# Patient Record
Sex: MALE | Race: WHITE | URBAN - METROPOLITAN AREA
[De-identification: names, ages, dates, MRNs, and addresses within clinical notes are randomized per-mention and may not be internally consistent; named-entity substitution may affect disease eponyms.]

---

## 2017-01-01 ENCOUNTER — OFFICE VISIT (OUTPATIENT)
Dept: FAMILY MEDICINE | Facility: CLINIC | Age: 47
End: 2017-01-01
Payer: MEDICARE

## 2017-01-01 ENCOUNTER — TELEPHONE (OUTPATIENT)
Dept: FAMILY MEDICINE | Facility: CLINIC | Age: 47
End: 2017-01-01

## 2017-01-01 ENCOUNTER — TRANSFERRED RECORDS (OUTPATIENT)
Dept: HEALTH INFORMATION MANAGEMENT | Facility: CLINIC | Age: 47
End: 2017-01-01

## 2017-01-01 ENCOUNTER — RADIANT APPOINTMENT (OUTPATIENT)
Dept: GENERAL RADIOLOGY | Facility: CLINIC | Age: 47
End: 2017-01-01
Attending: PHYSICIAN ASSISTANT
Payer: MEDICARE

## 2017-01-01 VITALS
SYSTOLIC BLOOD PRESSURE: 107 MMHG | OXYGEN SATURATION: 100 % | HEART RATE: 72 BPM | TEMPERATURE: 97.3 F | DIASTOLIC BLOOD PRESSURE: 74 MMHG

## 2017-01-01 VITALS
DIASTOLIC BLOOD PRESSURE: 83 MMHG | SYSTOLIC BLOOD PRESSURE: 123 MMHG | TEMPERATURE: 96.5 F | OXYGEN SATURATION: 97 % | HEART RATE: 62 BPM | RESPIRATION RATE: 16 BRPM

## 2017-01-01 VITALS — DIASTOLIC BLOOD PRESSURE: 75 MMHG | OXYGEN SATURATION: 97 % | SYSTOLIC BLOOD PRESSURE: 102 MMHG | HEART RATE: 60 BPM

## 2017-01-01 DIAGNOSIS — R19.7 DIARRHEA, UNSPECIFIED TYPE: Primary | ICD-10-CM

## 2017-01-01 DIAGNOSIS — R19.7 DIARRHEA, UNSPECIFIED TYPE: ICD-10-CM

## 2017-01-01 DIAGNOSIS — R14.0 BLOATED ABDOMEN: ICD-10-CM

## 2017-01-01 DIAGNOSIS — G80.8 CEREBRAL PALSY, INFANTILE HEMIPLEGIA (H): ICD-10-CM

## 2017-01-01 DIAGNOSIS — R14.3 FLATULENCE, ERUCTATION, AND GAS PAIN: ICD-10-CM

## 2017-01-01 DIAGNOSIS — J30.2 SEASONAL ALLERGIC RHINITIS, UNSPECIFIED CHRONICITY, UNSPECIFIED TRIGGER: ICD-10-CM

## 2017-01-01 DIAGNOSIS — Z01.818 PREOP GENERAL PHYSICAL EXAM: Primary | ICD-10-CM

## 2017-01-01 DIAGNOSIS — R14.2 FLATULENCE, ERUCTATION, AND GAS PAIN: ICD-10-CM

## 2017-01-01 DIAGNOSIS — G82.50 SPASTIC QUADRIPARESIS (H): ICD-10-CM

## 2017-01-01 DIAGNOSIS — K59.00 CONSTIPATION, UNSPECIFIED CONSTIPATION TYPE: Primary | ICD-10-CM

## 2017-01-01 DIAGNOSIS — Z23 ENCOUNTER FOR IMMUNIZATION: ICD-10-CM

## 2017-01-01 DIAGNOSIS — R14.1 FLATULENCE, ERUCTATION, AND GAS PAIN: ICD-10-CM

## 2017-01-01 DIAGNOSIS — K56.609 SMALL BOWEL OBSTRUCTION (H): Primary | ICD-10-CM

## 2017-01-01 LAB
C COLI+JEJUNI+LARI FUSA STL QL NAA+PROBE: NOT DETECTED
C DIFF TOX B STL QL: NEGATIVE
EC STX1 GENE STL QL NAA+PROBE: NOT DETECTED
EC STX2 GENE STL QL NAA+PROBE: NOT DETECTED
ENTERIC PATHOGEN COMMENT: NORMAL
G LAMBLIA AG STL QL IA: NORMAL
NOROV GI+II ORF1-ORF2 JNC STL QL NAA+PR: NOT DETECTED
RVA NSP5 STL QL NAA+PROBE: NOT DETECTED
SALMONELLA SP RPOD STL QL NAA+PROBE: NOT DETECTED
SHIGELLA SP+EIEC IPAH STL QL NAA+PROBE: NOT DETECTED
SPECIMEN SOURCE: NORMAL
SPECIMEN SOURCE: NORMAL
V CHOL+PARA RFBL+TRKH+TNAA STL QL NAA+PR: NOT DETECTED
Y ENTERO RECN STL QL NAA+PROBE: NOT DETECTED

## 2017-01-01 PROCEDURE — 90686 IIV4 VACC NO PRSV 0.5 ML IM: CPT | Performed by: FAMILY MEDICINE

## 2017-01-01 PROCEDURE — G0008 ADMIN INFLUENZA VIRUS VAC: HCPCS | Performed by: FAMILY MEDICINE

## 2017-01-01 PROCEDURE — 87493 C DIFF AMPLIFIED PROBE: CPT | Performed by: FAMILY MEDICINE

## 2017-01-01 PROCEDURE — 87329 GIARDIA AG IA: CPT | Performed by: FAMILY MEDICINE

## 2017-01-01 PROCEDURE — 99213 OFFICE O/P EST LOW 20 MIN: CPT | Performed by: FAMILY MEDICINE

## 2017-01-01 PROCEDURE — 74000 XR ABDOMEN 1 VW: CPT

## 2017-01-01 PROCEDURE — 99213 OFFICE O/P EST LOW 20 MIN: CPT | Mod: 25 | Performed by: FAMILY MEDICINE

## 2017-01-01 PROCEDURE — 99214 OFFICE O/P EST MOD 30 MIN: CPT | Performed by: PHYSICIAN ASSISTANT

## 2017-01-01 PROCEDURE — 87506 IADNA-DNA/RNA PROBE TQ 6-11: CPT | Performed by: FAMILY MEDICINE

## 2017-01-01 RX ORDER — BACLOFEN 20 MG/1
30 TABLET ORAL 3 TIMES DAILY
Qty: 405 TABLET | Refills: 3 | Status: SHIPPED | OUTPATIENT
Start: 2017-01-01

## 2017-01-01 RX ORDER — SIMETHICONE 40MG/0.6ML
40 SUSPENSION, DROPS(FINAL DOSAGE FORM)(ML) ORAL 4 TIMES DAILY PRN
Qty: 45 ML | Refills: 11 | Status: SHIPPED | OUTPATIENT
Start: 2017-01-01

## 2017-01-01 RX ORDER — POLYETHYLENE GLYCOL 3350 17 G/17G
1 POWDER, FOR SOLUTION ORAL DAILY
Qty: 510 G | Refills: 11 | Status: SHIPPED | OUTPATIENT
Start: 2017-01-01

## 2017-01-19 ENCOUNTER — TRANSFERRED RECORDS (OUTPATIENT)
Dept: HEALTH INFORMATION MANAGEMENT | Facility: CLINIC | Age: 47
End: 2017-01-19

## 2017-01-31 DIAGNOSIS — G80.8 CEREBRAL PALSY, INFANTILE HEMIPLEGIA (H): Primary | ICD-10-CM

## 2017-02-01 NOTE — TELEPHONE ENCOUNTER
Medication list as reported by patient now would like new prescription for CALCIUM CARBONATE PO .    Last office visit 12/1/16

## 2017-03-06 NOTE — NURSING NOTE
"Chief Complaint   Patient presents with     Bloated       Initial /74  Pulse 72  Temp 97.3  F (36.3  C) (Oral)  SpO2 100% Estimated body mass index is 19.31 kg/(m^2) as calculated from the following:    Height as of 4/29/11: 5' 8\" (1.727 m).    Weight as of 5/25/12: 127 lb (57.6 kg).  Medication Reconciliation: complete       Delaney Putnam CMA      "

## 2017-03-06 NOTE — PROGRESS NOTES
SUBJECTIVE:                                                    Raghav Silver is a 47 year old male who presents to clinic today for the following health issues:      Constipation     Onset: 4 days ago     Description:   Frequency of bowel movements: 1 per day .  Stool consistency: small caliber    Progression of Symptoms:  worsening    Accompanying Signs & Symptoms:  Abdominal pain (cramping?): no   Blood in stool: no   Rectal pain: no   Nausea/vomiting: no   Weight loss or gain: no    History:   History of abdominal surgery: no   Has tube     Precipitating factors:   Recent use of narcotics, anticholinergics, calcium channel blockers, antacids, or iron supplements: no   Chronic Laxative Use: no          Therapies Tried and outcome: enema , magnesium     Patient could not have BM for 4 days. Staff gave patient enema and he had a large stool. However, this did not resolve the bloated stomach. A couple hours later patient's stomach had gotten worse. Its hard, large, bloated. Patient is not verbal and can't describe how he feels.       Problem list and histories reviewed & adjusted, as indicated.  Additional history: as documented    Patient Active Problem List   Diagnosis     Seasonal allergic rhinitis     House dust mite allergy     Rhinitis, allergic to other allergen     Seasonal allergic conjunctivitis     CARDIOVASCULAR SCREENING; LDL GOAL LESS THAN 160     Diagnostic skin and sensitization tests     GERD (gastroesophageal reflux disease)     Cerebral palsy, infantile hemiplegia (H)     History reviewed. No pertinent past surgical history.    Social History   Substance Use Topics     Smoking status: Never Smoker     Smokeless tobacco: Never Used     Alcohol use No     Family History   Problem Relation Age of Onset     CANCER Father          Current Outpatient Prescriptions   Medication Sig Dispense Refill     calcium carbonate 1250 (500 CA) MG/5ML SUSP suspension TAKE 5ML (500MG ELEMENTAL CALCIUM) PER G-TUBE  TWICE DAILY. DILUTE WITH WATER. (SUPPLEMENT) *SHAKE WELL* 300 mL 11     order for DME Equipment being ordered: Wheelchair that is able to recline for position changes. Also needs seat cushion to prevent pressure sores. If able to, would recommend wheelchair pole to help with tube feedings. 1 each 3     NASAL DECONGESTANT 30 MG/5ML syrup TAKE 5ML (30MG) PER G-TUBE EVERY 4 HOURS AS NEEDED FOR CONGESTION. *3 TOTAL FILLS* 560 mL 11     omeprazole (PRILOSEC) 2 mg/mL Take 10 mLs (20 mg) by mouth daily 10 ML PER G-TUBE DAILY 300 mL 3     sucralfate (CARAFATE) 1 GM/10ML suspension 10 ml via g-tube QID before meals and QHS. Take on empty stomach. 1200 mL 3     pseudoePHEDrine (SUDAFED) 30 MG tablet Take 1 tablet (30 mg) by mouth every 4 hours as needed for congestion 112 tablet 2     olopatadine (PATANOL) 0.1 % ophthalmic solution Place 1 drop into both eyes 2 times daily as needed for allergies 5 mL 11     fluticasone (FLONASE) 50 MCG/ACT nasal spray Spray 2 sprays into both nostrils daily 1 Package 11     cetirizine (ZYRTEC) 5 MG/5ML syrup Take 10 mLs (10 mg) by mouth daily 300 mL 11     ORDER FOR DME Equipment being ordered: pressure alternating mattress 1 each 0     ORDER FOR DME Equipment being ordered: incontinence briefs. Patient has been dealing with incontinence since 6/11/2013 3 Month 4     calcitonin, salmon, (FORTICAL) 200 UNIT/ACT nasal spray Spray 1 spray into one nostril alternating nostrils daily. Alternate nostril each day.       nystatin 197326 UNIT/GM POWD Externally apply  topically as needed.       Sodium Phosphates (ENEMA RE) Place  rectally as needed.       bisacodyl (DULCOLAX) 10 MG suppository Place 10 mg rectally daily as needed.       betamethasone dipropionate (DIPROSONE) 0.05 % cream Apply 0.5 inches topically as needed.       Guaifenesin-Codeine (ROBITUSSIN A-C OR) Take by mouth as needed Reported on 3/6/2017       LANTISEPTIC SKIN PROTECTANT 50 % EX OINT PRN       CALCIUM CARBONATE PO Take  1250mg twice daily.       Docusate Sodium 50 MG/15ML LIQD Take 100 mg by mouth daily.       Magnesium Hydroxide (MILK OF MAGNESIA PO) Take 20 mLs by mouth daily.       CEROVITE OR LIQD 15 ML DAILY       METOCLOPRAMIDE HCL syrup 5mg QID half an hour AC       RANITIDINE  MG OR CAPS daily in am crush dissolve in 30 ml of water       ACETAMINOPHEN PO Take by mouth as needed Reported on 3/6/2017       diphenhydrAMINE (BENADRYL ALLERGY) 12.5 MG/5ML liquid Take by mouth as needed Reported on 3/6/2017       albuterol (2.5 MG/3ML) 0.083% nebulizer solution Take 3 mLs by nebulization every 6 hours as needed for shortness of breath / dyspnea. (Patient not taking: Reported on 3/6/2017) 1 Box 1     Allergies   Allergen Reactions     Dilantin [Barbiturates]        Reviewed and updated as needed this visit by clinical staff  Tobacco  Allergies  Meds  Med Hx  Surg Hx  Fam Hx  Soc Hx      Reviewed and updated as needed this visit by Provider         ROS:  Constitutional, HEENT, cardiovascular, pulmonary, gi and gu systems are negative, except as otherwise noted.    OBJECTIVE:                                                    /74  Pulse 72  Temp 97.3  F (36.3  C) (Oral)  SpO2 100%  There is no height or weight on file to calculate BMI.  GENERAL: in wheelchair, does not appear to be in acute distress  RESP: lungs clear to auscultation - no rales, rhonchi or wheezes  CV: regular rate and rhythm, normal S1 S2, no S3 or S4, no murmur, click or rub, no peripheral edema and peripheral pulses strong  ABDOMEN: largely distended abdomen, decreased bowel sounds      Diagnostic Test Results:  Xray - distended large and small bowel loops, no gas in the rectum     ASSESSMENT/PLAN:                                                          ICD-10-CM    1. Small bowel obstruction (H) K56.69    2. Bloated abdomen R14.0 CANCELED: XR Abdomen 2 Views   3. Cerebral palsy, infantile hemiplegia (H) G80.8      Highly suspected small bowel  obstruction based on xray. Patient was sent to ED to have CT done and treat.     Discussed xray and plan with Dr. Alvaro MENCHACA FM, he has agreed with xray findings and plan.   Patient's aid reported that [place] is a preferred hospital for the nursing home and that is where patient will be taken.    Evita Deluca PA-C  [facility]

## 2017-03-06 NOTE — MR AVS SNAPSHOT
"              After Visit Summary   3/6/2017    Raghav Silver    MRN: [id number]           Patient Information     Date Of Birth          1970        Visit Information        Provider Department      3/6/2017 4:00 PM Evita Deluca PA-C [facility]        Today's Diagnoses     Small bowel obstruction (H)    -  1    Bloated abdomen        Cerebral palsy, infantile hemiplegia (H)           Follow-ups after your visit        Who to contact     If you have questions or need follow up information about today's clinic visit or your schedule please contact [facility] directly at [phone].  Normal or non-critical lab and imaging results will be communicated to you by [employer], letter or phone within 4 business days after the clinic has received the results. If you do not hear from us within 7 days, please contact the clinic through [employer] or phone. If you have a critical or abnormal lab result, we will notify you by phone as soon as possible.  Submit refill requests through [employer] or call your pharmacy and they will forward the refill request to us. Please allow 3 business days for your refill to be completed.          Additional Information About Your Visit        MyChart Information     [employer] lets you send messages to your doctor, view your test results, renew your prescriptions, schedule appointments and more. To sign up, go to [email or web address] . Click on \"Log in\" on the left side of the screen, which will take you to the Welcome page. Then click on \"Sign up Now\" on the right side of the page.     You will be asked to enter the access code listed below, as well as some personal information. Please follow the directions to create your username and password.     Your access code is: [email or web address]  Expires: 3/14/2017 11:02 AM     Your access code will  in 90 days. If you need help or a new code, please call your [facility] or [phone].   "      Care EveryWhere ID     This is your Care EveryWhere ID. This could be used by other organizations to access your [facility] medical records  [id number]        Your Vitals Were     Pulse Temperature Pulse Oximetry             72 97.3  F (36.3  C) (Oral) 100%          Blood Pressure from Last 3 Encounters:   03/06/17 107/74   12/14/16 114/75   07/08/16 114/75    Weight from Last 3 Encounters:   05/25/12 127 lb (57.6 kg)   06/25/11 122 lb 14.4 oz (55.7 kg)   07/01/10 119 lb (54 kg)              Today, you had the following     No orders found for display       Primary Care Provider Office Phone # Fax #    Clemente Michelle [phone]       [facility] [address]  [facility] [address]        Thank you!     Thank you for choosing [facility]  for your care. Our goal is always to provide you with excellent care. Hearing back from our patients is one way we can continue to improve our services. Please take a few minutes to complete the written survey that you may receive in the mail after your visit with us. Thank you!             Your Updated Medication List - Protect others around you: Learn how to safely use, store and throw away your medicines at www.disposemymeds.org.          This list is accurate as of: 3/6/17  5:33 PM.  Always use your most recent med list.                   Brand Name Dispense Instructions for use    ACETAMINOPHEN PO      Take by mouth as needed Reported on 3/6/2017       albuterol (2.5 MG/3ML) 0.083% neb solution     1 Box    Take 3 mLs by nebulization every 6 hours as needed for shortness of breath / dyspnea.       BENADRYL ALLERGY 12.5 MG/5ML liquid   Generic drug:  diphenhydrAMINE      Take by mouth as needed Reported on 3/6/2017       betamethasone dipropionate 0.05 % cream    DIPROSONE     Apply 0.5 inches topically as needed.       * CALCIUM CARBONATE PO      Take 1250mg twice daily.       * calcium carbonate 1250 (500 CA) MG/5ML Susp  suspension     300 mL    TAKE 5ML (500MG ELEMENTAL CALCIUM) PER G-TUBE TWICE DAILY. DILUTE WITH WATER. (SUPPLEMENT) *SHAKE WELL*       cetirizine 5 MG/5ML syrup    zyrTEC    300 mL    Take 10 mLs (10 mg) by mouth daily       Docusate Sodium 50 MG/15ML Liqd      Take 100 mg by mouth daily.       DULCOLAX 10 MG Suppository   Generic drug:  bisacodyl      Place 10 mg rectally daily as needed.       ENEMA RE      Place  rectally as needed.       fluticasone 50 MCG/ACT spray    FLONASE    1 Package    Spray 2 sprays into both nostrils daily       FORTICAL 200 UNIT/ACT nasal spray   Generic drug:  calcitonin (salmon)      Spray 1 spray into one nostril alternating nostrils daily. Alternate nostril each day.       LANTISEPTIC SKIN PROTECTANT 50 % Oint      PRN       METOCLOPRAMIDE HCL      syrup 5mg QID half an hour AC       MILK OF MAGNESIA PO      Take 20 mLs by mouth daily.       multivitamin  s with minerals Liqd      15 ML DAILY       nystatin [id number] UNIT/GM Powd    MYCOSTATIN     Externally apply  topically as needed.       olopatadine 0.1 % ophthalmic solution    PATANOL    5 mL    Place 1 drop into both eyes 2 times daily as needed for allergies       omeprazole 2 mg/mL Susp    priLOSEC    300 mL    Take 10 mLs (20 mg) by mouth daily 10 ML PER G-TUBE DAILY       * order for DME     3 Month    Equipment being ordered: incontinence briefs. Patient has been dealing with incontinence since 6/11/2013       * order for DME     1 each    Equipment being ordered: pressure alternating mattress       order for DME     1 each    Equipment being ordered: Wheelchair that is able to recline for position changes. Also needs seat cushion to prevent pressure sores. If able to, would recommend wheelchair pole to help with tube feedings.       * pseudoePHEDrine 30 MG tablet    SUDAFED    112 tablet    Take 1 tablet (30 mg) by mouth every 4 hours as needed for congestion       * NASAL DECONGESTANT 30 MG/5ML syrup   Generic drug:   pseudoePHEDrine     560 mL    TAKE 5ML (30MG) PER G-TUBE EVERY 4 HOURS AS NEEDED FOR CONGESTION. *3 TOTAL FILLS*       ranitidine HCl 300 MG Caps      daily in am crush dissolve in 30 ml of water       ROBITUSSIN A-C OR      Take by mouth as needed Reported on 3/6/2017       sucralfate 1 GM/10ML suspension    CARAFATE    1200 mL    10 ml via g-tube QID before meals and QHS. Take on empty stomach.       * Notice:  This list has 6 medication(s) that are the same as other medications prescribed for you. Read the directions carefully, and ask your doctor or other care provider to review them with you.

## 2017-03-10 NOTE — TELEPHONE ENCOUNTER
Patient was diagnosed with severe constipation.  If applicable please send prescription to [facility].    Routing to provider. Please advise.    Aleena Suarez RN

## 2017-03-10 NOTE — TELEPHONE ENCOUNTER
Reason for Call:  Other prescription    Detailed comments:  Pt was released from the hospital and wanted him to start on miralax 17 mg daily and did not provide Pt with prescription to send to the pharmacy     Phone Number Patient can be reached at: Other phone number:  [phone]    Best Time: Anytime    Can we leave a detailed message on this number? YES    Call taken on 3/10/2017 at 3:54 PM by Femi Subramanian

## 2017-03-12 PROBLEM — K59.00 CONSTIPATION, UNSPECIFIED CONSTIPATION TYPE: Status: ACTIVE | Noted: 2017-01-01

## 2017-08-22 NOTE — TELEPHONE ENCOUNTER
Reason for Call:  Other Medication form recalled     Detailed comments: [facility] is calling in to get an okay to switch to the tablet form of Medication due to the liquid form is recalled. Please call the Pharmacy to give verbal okay to switch. Thanks.     Phone Number Pharmacy can be reached at: [phone]    Best Time: Anytime     Can we leave a detailed message on this number? YES    Call taken on 8/22/2017 at 5:05 PM by Grady Irvin

## 2017-08-23 NOTE — TELEPHONE ENCOUNTER
Call pharmacy and informed of Dr. Michelle's message.  Pharmacy stated understood and will change the med.    Michael Verma MA

## 2017-09-20 NOTE — NURSING NOTE
Injectable Influenza Immunization Documentation      1.  Has the patient received the information for the injectable influenza vaccine? YES    2. Is the patient 6 months of age or older? YES    3. Does the patient have any of the following contraindications?          Severe allergy to eggs? No     Severe allergic reaction to previous influenza vaccines? No     Allergy to contact lens solution/thimerosol? No     History of Guillain-[place] syndrome? No     Currently have moderate or severe illness? No         4.  The vaccine has been administered and the patient was instructed to wait 15 minutes before leaving the building in the event of an allergic reaction: YES    Vaccination given by Claudia Carnes MA

## 2017-09-20 NOTE — NURSING NOTE
"Chief Complaint   Patient presents with     Diarrhea     loose stools     Bloated       Initial /75 (BP Location: Right arm, Patient Position: Chair, Cuff Size: Adult Large)  Pulse 60  SpO2 97% Estimated body mass index is 19.31 kg/(m^2) as calculated from the following:    Height as of 4/29/11: 5' 8\" (1.727 m).    Weight as of 5/25/12: 127 lb (57.6 kg).  Medication Reconciliation: complete     Claudia Carnes MA      "

## 2017-09-20 NOTE — MR AVS SNAPSHOT
After Visit Summary   9/20/2017    Raghav Silver    MRN: [id number]           Patient Information     Date Of Birth          1970        Visit Information        Provider Department      9/20/2017 9:00 AM Clemente Michelle MD [facility]        Today's Diagnoses     Spastic quadriparesis (H)    -  1    Diarrhea, unspecified type        Encounter for immunization          Care Instructions    How to contact your care team providers:    [facility]  [phone]  Pharmacy [phone]    MYKE [address] Dr., PA-C, Dr., PA-C    Team RN: Bennie XAVIER and Mary DE LA ROSA    Clinic hours  M-Th 7am-7pm   Fri 7am-5pm.   Urgent care M-F 11am-9pm,   Sat/sun 9am-5pm.  Pharmacy M-F 8:00am-8pm Sat/sun 9am-5pm.     All password changes, disabled accounts, or ID changes in [employer]/MyHealth will be done by our Access Services Department.   If you need help with your account or password, call: [phone]. Clinic staff no longer has the ability to change passwords.                         Follow-ups after your visit        Future tests that were ordered for you today     Open Future Orders        Priority Expected Expires Ordered    Enteric Bacteria and Virus Panel by KALEN Stool Routine  9/20/2018 9/20/2017    Clostridium difficile toxin B PCR Routine  10/20/2017 9/20/2017    Giardia antigen Routine  10/20/2017 9/20/2017            Who to contact     If you have questions or need follow up information about today's clinic visit or your schedule please contact [facility] directly at [phone].  Normal or non-critical lab and imaging results will be communicated to you by MyChart, letter or phone within 4 business days after the clinic has received the results. If you do not hear from us within 7 days, please contact the  "clinic through [employer] or phone. If you have a critical or abnormal lab result, we will notify you by phone as soon as possible.  Submit refill requests through [employer] or call your pharmacy and they will forward the refill request to us. Please allow 3 business days for your refill to be completed.          Additional Information About Your Visit        [employer] Information     [employer] lets you send messages to your doctor, view your test results, renew your prescriptions, schedule appointments and more. To sign up, go to [email or web address] . Click on \"Log in\" on the left side of the screen, which will take you to the Welcome page. Then click on \"Sign up Now\" on the right side of the page.     You will be asked to enter the access code listed below, as well as some personal information. Please follow the directions to create your username and password.     Your access code is: [email or web address]  Expires: 2017  9:31 AM     Your access code will  in 90 days. If you need help or a new code, please call your [place] clinic or [phone].        Care EveryWhere ID     This is your Care EveryWhere ID. This could be used by other organizations to access your [place] medical records  [id number]        Your Vitals Were     Pulse Pulse Oximetry                60 97%           Blood Pressure from Last 3 Encounters:   17 102/75   17 107/74   16 114/75    Weight from Last 3 Encounters:   12 127 lb (57.6 kg)   11 122 lb 14.4 oz (55.7 kg)   07/01/10 119 lb (54 kg)              We Performed the Following     C FLU VAC QUADRIVALENT SPLIT VIRUS 3+YRS IM          Today's Medication Changes          These changes are accurate as of: 17  9:31 AM.  If you have any questions, ask your nurse or doctor.               Start taking these medicines.        Dose/Directions    baclofen 20 MG tablet   Commonly known as:  LIORESAL   Used for:  Spastic quadriparesis (H)   Started by:  Clemente Michelle, " MD        Dose:  30 mg   Take 1.5 tablets (30 mg) by mouth 3 times daily   Quantity:  405 tablet   Refills:  3            Where to get your medicines      These medications were sent to [facility] - [place], [place] - [address]  [address] [facility] [address]     Phone:  [phone]     baclofen 20 MG tablet                Primary Care Provider Office Phone # Fax #    Clemente Michelle [phone] [address]  [facility] [address]        Equal Access to Services     [facility]: Hadii aad ku hadasho Soomaali, waaxda luqadaha, qaybta kaalmada adeegyada, waxay laurain hayaan adezoë max . So [facility] [phone].    ATENCIÓN: Si habla español, tiene a lugo disposición servicios gratuitos de asistencia lingüística. [facility] [phone].    We comply with applicable federal civil rights laws and [place] laws. We do not discriminate on the basis of race, color, national origin, age, disability sex, sexual orientation or gender identity.            Thank you!     Thank you for choosing [facility]  for your care. Our goal is always to provide you with excellent care. Hearing back from our patients is one way we can continue to improve our services. Please take a few minutes to complete the written survey that you may receive in the mail after your visit with us. Thank you!             Your Updated Medication List - Protect others around you: Learn how to safely use, store and throw away your medicines at www.disposemymeds.org.          This list is accurate as of: 9/20/17  9:31 AM.  Always use your most recent med list.                   Brand Name Dispense Instructions for use Diagnosis    ACETAMINOPHEN PO      Take by mouth as needed Reported on 3/6/2017        albuterol (2.5 MG/3ML) 0.083% neb solution     1 Box    Take 3 mLs by nebulization every 6 hours as needed for shortness of breath / dyspnea.    Wheezing       baclofen 20 MG  tablet    LIORESAL    405 tablet    Take 1.5 tablets (30 mg) by mouth 3 times daily    Spastic quadriparesis (H)       BENADRYL ALLERGY 12.5 MG/5ML liquid   Generic drug:  diphenhydrAMINE      Take by mouth as needed Reported on 3/6/2017        betamethasone dipropionate 0.05 % cream    DIPROSONE     Apply 0.5 inches topically as needed.        calcium carbonate 1250 (500 CA) MG/5ML Susp suspension     300 mL    TAKE 5ML (500MG ELEMENTAL CALCIUM) PER G-TUBE TWICE DAILY. DILUTE WITH WATER. (SUPPLEMENT) *SHAKE WELL*    Cerebral palsy, infantile hemiplegia (H)       cetirizine 5 MG/5ML syrup    zyrTEC    300 mL    Take 10 mLs (10 mg) by mouth daily    Seasonal allergic rhinitis       Docusate Sodium 50 MG/15ML Liqd      Take 100 mg by mouth daily.        DULCOLAX 10 MG Suppository   Generic drug:  bisacodyl      Place 10 mg rectally daily as needed.        ENEMA RE      Place  rectally as needed.        fluticasone 50 MCG/ACT spray    FLONASE    1 Package    Spray 2 sprays into both nostrils daily    Seasonal allergic rhinitis       FORTICAL 200 UNIT/ACT nasal spray   Generic drug:  calcitonin (salmon)      Spray 1 spray into one nostril alternating nostrils daily. Alternate nostril each day.        LANTISEPTIC SKIN PROTECTANT 50 % Oint      PRN        METOCLOPRAMIDE HCL      syrup 5mg QID half an hour AC        MILK OF MAGNESIA PO      Take 20 mLs by mouth daily.        multivitamin  s with minerals Liqd      15 ML DAILY        nystatin 459767 UNIT/GM Powd    MYCOSTATIN     Externally apply  topically as needed.        olopatadine 0.1 % ophthalmic solution    PATANOL    5 mL    Place 1 drop into both eyes 2 times daily as needed for allergies    Seasonal allergic conjunctivitis       omeprazole 2 mg/mL Susp    priLOSEC    300 mL    Take 10 mLs (20 mg) by mouth daily 10 ML PER G-TUBE DAILY    Gastroesophageal reflux disease without esophagitis       * order for DME     3 Month    Equipment being ordered: incontinence  briefs. Patient has been dealing with incontinence since 6/11/2013    Incontinence       * order for DME     1 each    Equipment being ordered: pressure alternating mattress    Cerebral palsy, infantile hemiplegia (H)       order for DME     1 each    Equipment being ordered: Wheelchair that is able to recline for position changes. Also needs seat cushion to prevent pressure sores. If able to, would recommend wheelchair pole to help with tube feedings.    Cerebral palsy, infantile hemiplegia (H)       polyethylene glycol powder    MIRALAX    510 g    Take 17 g (1 capful) by mouth daily    Constipation, unspecified constipation type       * pseudoePHEDrine 30 MG tablet    SUDAFED    112 tablet    Take 1 tablet (30 mg) by mouth every 4 hours as needed for congestion    Seasonal allergic rhinitis       * NASAL DECONGESTANT 30 MG/5ML syrup   Generic drug:  pseudoePHEDrine     560 mL    TAKE 5ML (30MG) PER G-TUBE EVERY 4 HOURS AS NEEDED FOR CONGESTION. *3 TOTAL FILLS*    Seasonal allergic rhinitis       ranitidine HCl 300 MG Caps      daily in am crush dissolve in 30 ml of water        ROBITUSSIN A-C OR      Take by mouth as needed Reported on 3/6/2017        sucralfate 1 GM/10ML suspension    CARAFATE    1200 mL    10 ml via g-tube QID before meals and QHS. Take on empty stomach.    Gastroesophageal reflux disease without esophagitis       * Notice:  This list has 4 medication(s) that are the same as other medications prescribed for you. Read the directions carefully, and ask your doctor or other care provider to review them with you.

## 2017-09-20 NOTE — PROGRESS NOTES
SUBJECTIVE:   Raghav Silver is a 47 year old male who presents to clinic today for the following health issues:      Diarrhea  Onset: few days     Description:   Consistency of stool: loose  Blood in stool: no   Number of loose stools in past 24 hours: 3    Progression of Symptoms:  same    Accompanying Signs & Symptoms:  Fever: no   Nausea or vomiting; no   Abdominal pain: no, bloated  Episodes of constipation: no   Weight loss: no     History:   Ill contacts: no   Recent use of antibiotics: no    Recent travels: no          Recent medication-new or changes(Rx or OTC): no     Precipitating factors:   None.    Alleviating factors:   None.    Therapies Tried and outcome:  NOne.; Outcome: NA    Patient is more spastic per caregivers. More difficult with transfers from wheelchair.    Problem list and histories reviewed & adjusted, as indicated.  Additional history: as documented    Patient Active Problem List   Diagnosis     Seasonal allergic rhinitis     House dust mite allergy     Rhinitis, allergic to other allergen     Seasonal allergic conjunctivitis     CARDIOVASCULAR SCREENING; LDL GOAL LESS THAN 160     Diagnostic skin and sensitization tests     GERD (gastroesophageal reflux disease)     Cerebral palsy, infantile hemiplegia (H)     Constipation, unspecified constipation type     History reviewed. No pertinent surgical history.    Social History   Substance Use Topics     Smoking status: Never Smoker     Smokeless tobacco: Never Used     Alcohol use No     Family History   Problem Relation Age of Onset     CANCER Father              Reviewed and updated as needed this visit by clinical staffTobacco  Allergies  Meds  Med Hx  Surg Hx  Fam Hx  Soc Hx      Reviewed and updated as needed this visit by Provider         ROS:  Constitutional, HEENT, cardiovascular, pulmonary, GI, , musculoskeletal, neuro, skin, endocrine and psych systems are negative, except as otherwise noted.      OBJECTIVE:   /75  (BP Location: Right arm, Patient Position: Chair, Cuff Size: Adult Large)  Pulse 60  SpO2 97%  There is no height or weight on file to calculate BMI.  GENERAL: healthy, alert and no distress  NECK: no adenopathy, no asymmetry, masses, or scars and thyroid normal to palpation  RESP: lungs clear to auscultation - no rales, rhonchi or wheezes  CV: regular rate and rhythm, normal S1 S2, no S3 or S4, no murmur, click or rub, no peripheral edema and peripheral pulses strong  ABDOMEN: soft, nontender, no hepatosplenomegaly, no masses and bowel sounds normal  MS: no gross musculoskeletal defects noted, no edema    Diagnostic Test Results:  none     ASSESSMENT/PLAN:     (R19.7) Diarrhea, unspecified type  (primary encounter diagnosis)  Comment: likely viral  Plan: Enteric Bacteria and Virus Panel by KALEN Stool,         Clostridium difficile toxin B PCR, Giardia         antigen        Check stool studies. Push fluids. RTC if worsens.    (G82.50) Spastic quadriparesis (H)  Comment:   Plan: baclofen (LIORESAL) 20 MG tablet        Trial increase baclofen to 30 mg TID. Should f/u with physical medicine and rehab.    (Z23) Encounter for immunization  Comment:   Plan: HC FLU VAC PRESRV FREE QUAD SPLIT VIR 3+YRS IM,        ADMIN 1st VACCINE, CANCELED: C FLU VAC         QUADRIVALENT SPLIT VIRUS 3+YRS IM                  See Patient Instructions    Clemente Michelle MD, MD  [facility]

## 2017-09-20 NOTE — PATIENT INSTRUCTIONS
How to contact your care team providers:    [facility]  [phone]  Pharmacy [phone]    MYKE [address] Dr[address] Dr., PA-C, Dr., PA-C    Team RN: Bennie DE LA ROSA    Clinic hours  M-Th 7am-7pm   Fri 7am-5pm.   Urgent care M-F 11am-9pm,   Sat/sun 9am-5pm.  Pharmacy M-F 8:00am-8pm Sat/sun 9am-5pm.     All password changes, disabled accounts, or ID changes in [employer]/MyHealth will be done by our Access Services Department.   If you need help with your account or password, call: [phone]. Clinic staff no longer has the ability to change passwords.

## 2017-10-13 NOTE — TELEPHONE ENCOUNTER
NASAL DECONGESTANT 30 MG/5ML syrup      Last Written Prescription Date: 06/14/16  Last Fill Quantity: 472ml, # refills: 11  Last Office Visit with [facility], P or [facility] prescribing provider: 09/20/17        BP Readings from Last 3 Encounters:   09/20/17 102/75   03/06/17 107/74   12/14/16 114/75     Lab Results   Component Value Date    AST 26 05/25/2012     Lab Results   Component Value Date    ALT 13 05/25/2012     Creatinine   Date Value Ref Range Status   05/25/2012 0.58 (L) 0.66 - 1.25 mg/dL Final         [facility]

## 2017-10-25 NOTE — TELEPHONE ENCOUNTER
PCP has signed a POLST and Physician's orders 10/24/2017.  Original was sent to pt as requested.  Copy made and placed in orange folder/abstraction.  SHIRA Jones ([facility])

## 2017-12-27 NOTE — NURSING NOTE
"Chief Complaint   Patient presents with     Pre-Op Exam       Initial /83 (BP Location: Left arm, Patient Position: Left side, Cuff Size: Adult Regular)  Pulse 62  Temp 96.5  F (35.8  C) (Oral)  Resp 16  SpO2 97% Estimated body mass index is 19.31 kg/(m^2) as calculated from the following:    Height as of 4/29/11: 1.727 m (5' 8\").    Weight as of 5/25/12: 57.6 kg (127 lb).  Medication Reconciliation: complete     Will Eddie TRINIDAD      "

## 2017-12-27 NOTE — PROGRESS NOTES
[address] [address]  [phone]  Dept: [phone]    PRE-OP EVALUATION:  Today's date: 2017    Raghav Silver (: 1970) presents for pre-operative evaluation assessment as requested by TBD.  He requires evaluation and anesthesia risk assessment prior to undergoing surgery/procedure for treatment of dental .  Proposed procedure: deep cleaning under IV Sedation    Date of Surgery/ Procedure: 18  Time of Surgery/ Procedure: 9:00 AM  Hospital/Surgical Facility: [facility]  Fax number for surgical facility:   Primary Physician: Clemente Michelle  Type of Anesthesia Anticipated: General    Patient has a Health Care Directive or Living Will:  NO    1. NO - Do you have a history of heart attack, stroke, stent, bypass or surgery on an artery in the head, neck, heart or legs?  2. NO - Do you ever have any pain or discomfort in your chest?  3. NO - Do you have a history of  Heart Failure?  4. NO - Are you troubled by shortness of breath when: walking on the level, up a slight hill or at night?  5. NO - Do you currently have a cold, bronchitis or other respiratory infection?  6. NO - Do you have a cough, shortness of breath or wheezing?  7. NO - Do you sometimes get pains in the calves of your legs when you walk?  8. NO - Do you or anyone in your family have previous history of blood clots?  9. NO - Do you or does anyone in your family have a serious bleeding problem such as prolonged bleeding following surgeries or cuts?  10. NO - Have you ever had problems with anemia or been told to take iron pills?  11. NO - Have you had any abnormal blood loss such as black, tarry or bloody stools, or abnormal vaginal bleeding?  12. NO - Have you ever had a blood transfusion?  13. NO - Have you or any of your relatives ever had problems with anesthesia?  14. NO - Do you have sleep apnea, excessive snoring or daytime drowsiness?  15. NO - Do  you have any prosthetic heart valves?  16. NO - Do you have prosthetic joints?  17. NO - Is there any chance that you may be pregnant?        HPI:                                                      Brief HPI related to upcoming procedure: routine deep dental cleaning.      See problem list for active medical problems.  Problems all longstanding and stable, except as noted/documented.  See ROS for pertinent symptoms related to these conditions.                                                                                                  .    MEDICAL HISTORY:                                                    Patient Active Problem List    Diagnosis Date Noted     Constipation, unspecified constipation type 03/12/2017     Priority: Medium     Cerebral palsy, infantile hemiplegia (H) 05/30/2012     Priority: Medium     GERD (gastroesophageal reflux disease) 05/25/2012     Priority: Medium     Diagnostic skin and sensitization tests      Priority: Medium     CARDIOVASCULAR SCREENING; LDL GOAL LESS THAN 160 10/31/2010     Priority: Medium     Seasonal allergic rhinitis      Priority: Medium     House dust mite allergy      Priority: Medium     Rhinitis, allergic to other allergen      Priority: Medium     [facility] update changed this record. Please review for accuracy       Seasonal allergic conjunctivitis      Priority: Medium      Past Medical History:   Diagnosis Date     Diagnostic skin and sensitization tests 6/00 skin tests per Avis pos. for:  DM/M/T/RW      Encounter for PEG (percutaneous endoscopic gastrostomy) (H) 1998     House dust mite allergy      Infantile cerebral palsy, unspecified      Mental retardation, profound (I.Q. < 20)      Rhinitis, allergic to other allergen      Seasonal allergic conjunctivitis      Seasonal allergic rhinitis 6/00 skin tests per Avis pos. for:  DM/M/T/RW    DM/M/T/RW     History reviewed. No pertinent surgical history.  Current Outpatient Prescriptions   Medication Sig Dispense  Refill     NASAL DECONGESTANT 30 MG/5ML syrup TAKE 5ML (30MG) PER G-TUBE EVERY 4 HOURS AS NEEDED FOR CONGESTION *6 TOTAL FILLS* 472 mL 11     baclofen (LIORESAL) 20 MG tablet Take 1.5 tablets (30 mg) by mouth 3 times daily 405 tablet 3     polyethylene glycol (MIRALAX) powder Take 17 g (1 capful) by mouth daily 510 g 11     calcium carbonate 1250 (500 CA) MG/5ML SUSP suspension TAKE 5ML (500MG ELEMENTAL CALCIUM) PER G-TUBE TWICE DAILY. DILUTE WITH WATER. (SUPPLEMENT) *SHAKE WELL* 300 mL 11     order for DME Equipment being ordered: Wheelchair that is able to recline for position changes. Also needs seat cushion to prevent pressure sores. If able to, would recommend wheelchair pole to help with tube feedings. 1 each 3     omeprazole (PRILOSEC) 2 mg/mL Take 10 mLs (20 mg) by mouth daily 10 ML PER G-TUBE DAILY 300 mL 3     sucralfate (CARAFATE) 1 GM/10ML suspension 10 ml via g-tube QID before meals and QHS. Take on empty stomach. 1200 mL 3     pseudoePHEDrine (SUDAFED) 30 MG tablet Take 1 tablet (30 mg) by mouth every 4 hours as needed for congestion 112 tablet 2     olopatadine (PATANOL) 0.1 % ophthalmic solution Place 1 drop into both eyes 2 times daily as needed for allergies 5 mL 11     fluticasone (FLONASE) 50 MCG/ACT nasal spray Spray 2 sprays into both nostrils daily 1 Package 11     cetirizine (ZYRTEC) 5 MG/5ML syrup Take 10 mLs (10 mg) by mouth daily 300 mL 11     ORDER FOR DME Equipment being ordered: pressure alternating mattress 1 each 0     ORDER FOR DME Equipment being ordered: incontinence briefs. Patient has been dealing with incontinence since 6/11/2013 3 Month 4     calcitonin, salmon, (FORTICAL) 200 UNIT/ACT nasal spray Spray 1 spray into one nostril alternating nostrils daily. Alternate nostril each day.       nystatin 152209 UNIT/GM POWD Externally apply  topically as needed.       Sodium Phosphates (ENEMA RE) Place  rectally as needed.       ACETAMINOPHEN PO Take by mouth as needed Reported on  3/6/2017       bisacodyl (DULCOLAX) 10 MG suppository Place 10 mg rectally daily as needed.       betamethasone dipropionate (DIPROSONE) 0.05 % cream Apply 0.5 inches topically as needed.       diphenhydrAMINE (BENADRYL ALLERGY) 12.5 MG/5ML liquid Take by mouth as needed Reported on 3/6/2017       Guaifenesin-Codeine (ROBITUSSIN A-C OR) Take by mouth as needed Reported on 3/6/2017       albuterol (2.5 MG/3ML) 0.083% nebulizer solution Take 3 mLs by nebulization every 6 hours as needed for shortness of breath / dyspnea. 1 Box 1     LANTISEPTIC SKIN PROTECTANT 50 % EX OINT PRN       Docusate Sodium 50 MG/15ML LIQD Take 100 mg by mouth daily.       Magnesium Hydroxide (MILK OF MAGNESIA PO) Take 20 mLs by mouth daily.       CEROVITE OR LIQD 15 ML DAILY       METOCLOPRAMIDE HCL syrup 5mg QID half an hour AC       RANITIDINE  MG OR CAPS daily in am crush dissolve in 30 ml of water       OTC products: no recent use of OTC ASA, NSAIDS or Steroids    Allergies   Allergen Reactions     Dilantin [Barbiturates]       Latex Allergy: NO    Social History   Substance Use Topics     Smoking status: Never Smoker     Smokeless tobacco: Never Used     Alcohol use No     History   Drug Use No       REVIEW OF SYSTEMS:                                                    C: NEGATIVE for fever, chills, change in weight  E/M: NEGATIVE for ear, mouth and throat problems  R: NEGATIVE for significant cough or SOB  CV: NEGATIVE for chest pain, palpitations or peripheral edema    EXAM:                                                    /83 (BP Location: Left arm, Patient Position: Left side, Cuff Size: Adult Regular)  Pulse 62  Temp 96.5  F (35.8  C) (Oral)  Resp 16  SpO2 97%  GENERAL APPEARANCE: healthy, alert and no distress  HENT: ear canals and TM's normal and nose and mouth without ulcers or lesions  RESP: lungs clear to auscultation - no rales, rhonchi or wheezes  CV: regular rate and rhythm, normal S1 S2, no S3 or S4 and  no murmur, click or rub   ABDOMEN: soft, nontender, no HSM or masses and bowel sounds normal  NEURO: Normal strength and tone, sensory exam grossly normal, mentation intact and speech normal    DIAGNOSTICS:                                                    No labs or EKG required for low risk surgery (cataract, skin procedure, breast biopsy, etc)    Recent Labs   Lab Test  05/25/12   0847  04/26/10   0952   HGB   --   16.3   PLT   --   299   NA  140   --    POTASSIUM  4.0   --    CR  0.58*   --         IMPRESSION:                                                    Reason for surgery/procedure: deep dental cleaning  Diagnosis/reason for consult: preop clearance    The proposed surgical procedure is considered LOW risk.    REVISED CARDIAC RISK INDEX  The patient has the following serious cardiovascular risks for perioperative complications such as (MI, PE, VFib and 3  AV Block):  No serious cardiac risks  INTERPRETATION: 0 risks: Class I (very low risk - 0.4% complication rate)    The patient has the following additional risks for perioperative complications:  No identified additional risks      ICD-10-CM    1. Preop general physical exam Z01.818        RECOMMENDATIONS:                                                      APPROVAL GIVEN to proceed with proposed procedure, without further diagnostic evaluation       Signed Electronically by: Clemente Michelle MD, MD    Copy of this evaluation report is provided to requesting physician.    [facility] Preop Guidelines

## 2017-12-27 NOTE — MR AVS SNAPSHOT
After Visit Summary   12/27/2017    Raghav Silver    MRN: [id number]           Patient Information     Date Of Birth          1970        Visit Information        Provider Department      12/27/2017 9:00 AM Clemente Michelle MD [facility]        Today's Diagnoses     Preop general physical exam    -  1    Flatulence, eructation, and gas pain          Care Instructions      Before Your Surgery      Call your surgeon if there is any change in your health. This includes signs of a cold or flu (such as a sore throat, runny nose, cough, rash or fever).    Do not smoke, drink alcohol or take over the counter medicine (unless your surgeon or primary care doctor tells you to) for the 24 hours before and after surgery.    If you take prescribed drugs: Follow your doctor s orders about which medicines to take and which to stop until after surgery.    Eating and drinking prior to surgery: follow the instructions from your surgeon    Take a shower or bath the night before surgery. Use the soap your surgeon gave you to gently clean your skin. If you do not have soap from your surgeon, use your regular soap. Do not shave or scrub the surgery site.  Wear clean pajamas and have clean sheets on your bed.           Follow-ups after your visit        Who to contact     If you have questions or need follow up information about today's clinic visit or your schedule please contact [facility] directly at [phone].  Normal or non-critical lab and imaging results will be communicated to you by MyChart, letter or phone within 4 business days after the clinic has received the results. If you do not hear from us within 7 days, please contact the clinic through MyChart or phone. If you have a critical or abnormal lab result, we will notify you by phone as soon as possible.  Submit refill requests through [employer] or call your pharmacy and they will forward the refill request to us. Please  "allow 3 business days for your refill to be completed.          Additional Information About Your Visit        MyChart Information     [employer] lets you send messages to your doctor, view your test results, renew your prescriptions, schedule appointments and more. To sign up, go to [email or web address] . Click on \"Log in\" on the left side of the screen, which will take you to the Welcome page. Then click on \"Sign up Now\" on the right side of the page.     You will be asked to enter the access code listed below, as well as some personal information. Please follow the directions to create your username and password.     Your access code is: [email or web address]  Expires: 3/27/2018  9:22 AM     Your access code will  in 90 days. If you need help or a new code, please call your [facility] clinic or [phone].        Care EveryWhere ID     This is your Care EveryWhere ID. This could be used by other organizations to access your [facility] medical records  [id number]        Your Vitals Were     Pulse Temperature Respirations Pulse Oximetry          62 96.5  F (35.8  C) (Oral) 16 97%         Blood Pressure from Last 3 Encounters:   17 123/83   17 102/75   17 107/74    Weight from Last 3 Encounters:   12 57.6 kg (127 lb)   11 55.7 kg (122 lb 14.4 oz)   07/01/10 54 kg (119 lb)              Today, you had the following     No orders found for display         Today's Medication Changes          These changes are accurate as of: 17  9:22 AM.  If you have any questions, ask your nurse or doctor.               Start taking these medicines.        Dose/Directions    simethicone 40 MG/0.6ML suspension   Commonly known as:  MYLICON   Used for:  Flatulence, eructation, and gas pain   Started by:  Clemente Michelle MD        Dose:  40 mg   0.6 mLs (40 mg) by Per G Tube route 4 times daily as needed for cramping or flatulence   Quantity:  45 mL   Refills:  11            Where to get your medicines    "   These medications were sent to [facility] - [place], [place] - [address]  [address] [facility] [address]     Phone:  [phone]     simethicone 40 MG/0.6ML suspension                Primary Care Provider Office Phone # Fax #    Clemente Michelle [phone]       [address]  [facility] [address]        Equal Access to Services     [facility]: Hadii aad ku hadasho Soomaali, waaxda luqadaha, qaybta kaalmada adeegyada, waxay idiin hayaan adeeg kharash la'garrison . So [facility] [phone].    ATENCIÓN: Si habla español, tiene a lugo disposición servicios gratuitos de asistencia lingüística. [facility] [phone].    We comply with applicable federal civil rights laws and [place] laws. We do not discriminate on the basis of race, color, national origin, age, disability, sex, sexual orientation, or gender identity.            Thank you!     Thank you for choosing [facility]  for your care. Our goal is always to provide you with excellent care. Hearing back from our patients is one way we can continue to improve our services. Please take a few minutes to complete the written survey that you may receive in the mail after your visit with us. Thank you!             Your Updated Medication List - Protect others around you: Learn how to safely use, store and throw away your medicines at www.disposemymeds.org.          This list is accurate as of: 12/27/17  9:22 AM.  Always use your most recent med list.                   Brand Name Dispense Instructions for use Diagnosis    ACETAMINOPHEN PO      Take by mouth as needed Reported on 3/6/2017        albuterol (2.5 MG/3ML) 0.083% neb solution     1 Box    Take 3 mLs by nebulization every 6 hours as needed for shortness of breath / dyspnea.    Wheezing       baclofen 20 MG tablet    LIORESAL    405 tablet    Take 1.5 tablets (30 mg) by mouth 3 times daily    Spastic quadriparesis (H)       BENADRYL ALLERGY 12.5  MG/5ML liquid   Generic drug:  diphenhydrAMINE      Take by mouth as needed Reported on 3/6/2017        betamethasone dipropionate 0.05 % cream    DIPROSONE     Apply 0.5 inches topically as needed.        calcium carbonate 1250 (500 CA) MG/5ML Susp suspension     300 mL    TAKE 5ML (500MG ELEMENTAL CALCIUM) PER G-TUBE TWICE DAILY. DILUTE WITH WATER. (SUPPLEMENT) *SHAKE WELL*    Cerebral palsy, infantile hemiplegia (H)       cetirizine 5 MG/5ML syrup    zyrTEC    300 mL    Take 10 mLs (10 mg) by mouth daily    Seasonal allergic rhinitis       Docusate Sodium 50 MG/15ML Liqd      Take 100 mg by mouth daily.        DULCOLAX 10 MG Suppository   Generic drug:  bisacodyl      Place 10 mg rectally daily as needed.        ENEMA RE      Place  rectally as needed.        fluticasone 50 MCG/ACT spray    FLONASE    1 Package    Spray 2 sprays into both nostrils daily    Seasonal allergic rhinitis       FORTICAL 200 UNIT/ACT nasal spray   Generic drug:  calcitonin (salmon)      Spray 1 spray into one nostril alternating nostrils daily. Alternate nostril each day.        LANTISEPTIC SKIN PROTECTANT 50 % Oint      PRN        METOCLOPRAMIDE HCL      syrup 5mg QID half an hour AC        MILK OF MAGNESIA PO      Take 20 mLs by mouth daily.        multivitamin  s with minerals Liqd      15 ML DAILY        nystatin 596816 UNIT/GM Powd    MYCOSTATIN     Externally apply  topically as needed.        olopatadine 0.1 % ophthalmic solution    PATANOL    5 mL    Place 1 drop into both eyes 2 times daily as needed for allergies    Seasonal allergic conjunctivitis       omeprazole 2 mg/mL Susp    priLOSEC    300 mL    Take 10 mLs (20 mg) by mouth daily 10 ML PER G-TUBE DAILY    Gastroesophageal reflux disease without esophagitis       * order for DME     3 Month    Equipment being ordered: incontinence briefs. Patient has been dealing with incontinence since 6/11/2013    Incontinence       * order for DME     1 each    Equipment being  ordered: pressure alternating mattress    Cerebral palsy, infantile hemiplegia (H)       order for DME     1 each    Equipment being ordered: Wheelchair that is able to recline for position changes. Also needs seat cushion to prevent pressure sores. If able to, would recommend wheelchair pole to help with tube feedings.    Cerebral palsy, infantile hemiplegia (H)       polyethylene glycol powder    MIRALAX    510 g    Take 17 g (1 capful) by mouth daily    Constipation, unspecified constipation type       * pseudoePHEDrine 30 MG tablet    SUDAFED    112 tablet    Take 1 tablet (30 mg) by mouth every 4 hours as needed for congestion    Seasonal allergic rhinitis       * NASAL DECONGESTANT 30 MG/5ML syrup   Generic drug:  pseudoePHEDrine     472 mL    TAKE 5ML (30MG) PER G-TUBE EVERY 4 HOURS AS NEEDED FOR CONGESTION *6 TOTAL FILLS*    Seasonal allergic rhinitis, unspecified chronicity, unspecified trigger       ranitidine HCl 300 MG Caps      daily in am crush dissolve in 30 ml of water        ROBITUSSIN A-C OR      Take by mouth as needed Reported on 3/6/2017        simethicone 40 MG/0.6ML suspension    MYLICON    45 mL    0.6 mLs (40 mg) by Per G Tube route 4 times daily as needed for cramping or flatulence    Flatulence, eructation, and gas pain       sucralfate 1 GM/10ML suspension    CARAFATE    1200 mL    10 ml via g-tube QID before meals and QHS. Take on empty stomach.    Gastroesophageal reflux disease without esophagitis       * Notice:  This list has 4 medication(s) that are the same as other medications prescribed for you. Read the directions carefully, and ask your doctor or other care provider to review them with you.

## 2018-01-01 ENCOUNTER — TELEPHONE (OUTPATIENT)
Dept: FAMILY MEDICINE | Facility: CLINIC | Age: 48
End: 2018-01-01

## 2018-01-01 ENCOUNTER — TRANSFERRED RECORDS (OUTPATIENT)
Dept: HEALTH INFORMATION MANAGEMENT | Facility: CLINIC | Age: 48
End: 2018-01-01

## 2018-01-01 ENCOUNTER — MEDICAL CORRESPONDENCE (OUTPATIENT)
Dept: HEALTH INFORMATION MANAGEMENT | Facility: CLINIC | Age: 48
End: 2018-01-01

## 2018-01-01 DIAGNOSIS — K21.9 GASTROESOPHAGEAL REFLUX DISEASE WITHOUT ESOPHAGITIS: ICD-10-CM

## 2018-01-01 DIAGNOSIS — K21.9 GASTROESOPHAGEAL REFLUX DISEASE WITHOUT ESOPHAGITIS: Primary | ICD-10-CM

## 2018-01-09 NOTE — TELEPHONE ENCOUNTER
Plan does not cover this medication. Please call plan at [phone] to initiate prior authorization or call/fax pharmacy to change medication at [phone]. Patient ID # is [id number].          [facility]

## 2018-01-16 NOTE — TELEPHONE ENCOUNTER
Prior auth phone [phone] [facility].  Representative Adia argueta requires compounded ingredients for initiation.    Contacted pharmacy. Compound ingredients includes  1. Omeprazole 20mg capsules #30 NDC [phone]  2. Sodium bicarbonate 650mg tablets #30 NDC [phone]  3. Sterile water solution 300ml NDC [phone]    Contacted [facility] again, talked to representative Terence and initiated prior auth today.  She is requesting documentation to support need for omeprazole and tried/failed medications.  Please fax to [phone] asap and Clinical Review Team will response within 24-72 hours.    Bayron Jiménez CMA

## 2018-01-17 PROBLEM — K21.9 GASTROESOPHAGEAL REFLUX DISEASE WITHOUT ESOPHAGITIS: Status: ACTIVE | Noted: 2018-01-01

## 2018-01-17 NOTE — TELEPHONE ENCOUNTER
Reason for Call:  Other     Detailed comments: Dental clinic called please fax pre-op notes and labs to Fax [phone].     Phone Number [employer] [facility] can be reached at: [phone]    Best Time: any    Can we leave a detailed message on this number? YES    Call taken on 1/17/2018 at 1:08 PM by Brandi Beard

## 2018-02-14 NOTE — TELEPHONE ENCOUNTER
Please let them know that okay to d/c omeprazole and start pantoprazole liquid form daily. This was sent to [facility]. Okay to d/c multivitamin.    Clemente Michelle MD

## 2018-02-14 NOTE — TELEPHONE ENCOUNTER
Reason for Call:  Other     Detailed comments: Cassandra RN wants Multivitiam d/c has J tube and there is no liquid supplement we can use and the pills clog his tube. Omeprazole can no longer get in liquid and same pills clog j tube. Please call back and advise.     Phone Number Patient can be reached at: [phone]    Best Time: any    Can we leave a detailed message on this number? YES    Call taken on 2/14/2018 at 2:08 PM by Brandi Beard